# Patient Record
Sex: MALE | Race: BLACK OR AFRICAN AMERICAN | Employment: FULL TIME | URBAN - METROPOLITAN AREA
[De-identification: names, ages, dates, MRNs, and addresses within clinical notes are randomized per-mention and may not be internally consistent; named-entity substitution may affect disease eponyms.]

---

## 2019-02-12 PROBLEM — I65.23 BILATERAL CAROTID ARTERY STENOSIS: Status: ACTIVE | Noted: 2019-02-12

## 2019-02-12 PROBLEM — Z86.73 HISTORY OF CVA (CEREBROVASCULAR ACCIDENT): Status: ACTIVE | Noted: 2019-02-12

## 2019-02-12 PROBLEM — Q21.12 PFO (PATENT FORAMEN OVALE): Status: ACTIVE | Noted: 2019-02-12

## 2019-03-27 ENCOUNTER — HOSPITAL ENCOUNTER (OUTPATIENT)
Dept: CARDIAC CATH/INVASIVE PROCEDURES | Age: 44
Discharge: HOME | End: 2019-03-27
Attending: INTERNAL MEDICINE | Admitting: INTERNAL MEDICINE
Payer: OTHER GOVERNMENT

## 2019-03-27 VITALS
BODY MASS INDEX: 29.13 KG/M2 | WEIGHT: 227 LBS | HEART RATE: 53 BPM | TEMPERATURE: 98 F | RESPIRATION RATE: 14 BRPM | DIASTOLIC BLOOD PRESSURE: 66 MMHG | HEIGHT: 74 IN | SYSTOLIC BLOOD PRESSURE: 119 MMHG | OXYGEN SATURATION: 96 %

## 2019-03-27 LAB
ANION GAP SERPL CALC-SCNC: 6 MMOL/L (ref 7–16)
ATRIAL RATE: 59 BPM
BUN SERPL-MCNC: 16 MG/DL (ref 6–23)
CALCIUM SERPL-MCNC: 8.8 MG/DL (ref 8.3–10.4)
CALCULATED P AXIS, ECG09: 12 DEGREES
CALCULATED R AXIS, ECG10: 58 DEGREES
CALCULATED T AXIS, ECG11: 14 DEGREES
CHLORIDE SERPL-SCNC: 107 MMOL/L (ref 98–107)
CO2 SERPL-SCNC: 26 MMOL/L (ref 21–32)
CREAT SERPL-MCNC: 1.37 MG/DL (ref 0.8–1.5)
DIAGNOSIS, 93000: NORMAL
ERYTHROCYTE [DISTWIDTH] IN BLOOD BY AUTOMATED COUNT: 13.3 % (ref 11.9–14.6)
GLUCOSE SERPL-MCNC: 87 MG/DL (ref 65–100)
HCT VFR BLD AUTO: 48.3 % (ref 41.1–50.3)
HGB BLD-MCNC: 16.3 G/DL (ref 13.6–17.2)
INR PPP: 1.1
MAGNESIUM SERPL-MCNC: 2.2 MG/DL (ref 1.8–2.4)
MCH RBC QN AUTO: 31.2 PG (ref 26.1–32.9)
MCHC RBC AUTO-ENTMCNC: 33.7 G/DL (ref 31.4–35)
MCV RBC AUTO: 92.5 FL (ref 79.6–97.8)
NRBC # BLD: 0 K/UL (ref 0–0.2)
P-R INTERVAL, ECG05: 256 MS
PLATELET # BLD AUTO: 225 K/UL (ref 150–450)
PMV BLD AUTO: 10.5 FL (ref 9.4–12.3)
POTASSIUM SERPL-SCNC: 4.3 MMOL/L (ref 3.5–5.1)
PROTHROMBIN TIME: 13.9 SEC (ref 11.7–14.5)
Q-T INTERVAL, ECG07: 450 MS
QRS DURATION, ECG06: 84 MS
QTC CALCULATION (BEZET), ECG08: 445 MS
RBC # BLD AUTO: 5.22 M/UL (ref 4.23–5.6)
SODIUM SERPL-SCNC: 139 MMOL/L (ref 136–145)
VENTRICULAR RATE, ECG03: 59 BPM
WBC # BLD AUTO: 5.4 K/UL (ref 4.3–11.1)

## 2019-03-27 PROCEDURE — 85610 PROTHROMBIN TIME: CPT

## 2019-03-27 PROCEDURE — 74011250636 HC RX REV CODE- 250/636: Performed by: INTERNAL MEDICINE

## 2019-03-27 PROCEDURE — 85027 COMPLETE CBC AUTOMATED: CPT

## 2019-03-27 PROCEDURE — 93312 ECHO TRANSESOPHAGEAL: CPT

## 2019-03-27 PROCEDURE — 80048 BASIC METABOLIC PNL TOTAL CA: CPT

## 2019-03-27 PROCEDURE — 74011250636 HC RX REV CODE- 250/636

## 2019-03-27 PROCEDURE — 93005 ELECTROCARDIOGRAM TRACING: CPT | Performed by: INTERNAL MEDICINE

## 2019-03-27 PROCEDURE — 99152 MOD SED SAME PHYS/QHP 5/>YRS: CPT

## 2019-03-27 PROCEDURE — 83735 ASSAY OF MAGNESIUM: CPT

## 2019-03-27 RX ORDER — FENTANYL CITRATE 50 UG/ML
25-100 INJECTION, SOLUTION INTRAMUSCULAR; INTRAVENOUS
Status: DISCONTINUED | OUTPATIENT
Start: 2019-03-27 | End: 2019-03-27 | Stop reason: HOSPADM

## 2019-03-27 RX ORDER — MIDAZOLAM HYDROCHLORIDE 1 MG/ML
.5-2 INJECTION, SOLUTION INTRAMUSCULAR; INTRAVENOUS
Status: DISCONTINUED | OUTPATIENT
Start: 2019-03-27 | End: 2019-03-27 | Stop reason: HOSPADM

## 2019-03-27 RX ORDER — SODIUM CHLORIDE 9 MG/ML
75 INJECTION, SOLUTION INTRAVENOUS CONTINUOUS
Status: DISCONTINUED | OUTPATIENT
Start: 2019-03-27 | End: 2019-03-27 | Stop reason: HOSPADM

## 2019-03-27 RX ORDER — ASPIRIN 325 MG
325 TABLET, DELAYED RELEASE (ENTERIC COATED) ORAL DAILY
COMMUNITY
End: 2019-04-27

## 2019-03-27 RX ADMIN — FENTANYL CITRATE 50 MCG: 50 INJECTION, SOLUTION INTRAMUSCULAR; INTRAVENOUS at 10:05

## 2019-03-27 RX ADMIN — MIDAZOLAM HYDROCHLORIDE 2 MG: 1 INJECTION, SOLUTION INTRAMUSCULAR; INTRAVENOUS at 10:05

## 2019-03-27 RX ADMIN — MIDAZOLAM HYDROCHLORIDE 2 MG: 1 INJECTION, SOLUTION INTRAMUSCULAR; INTRAVENOUS at 10:11

## 2019-03-27 RX ADMIN — MIDAZOLAM HYDROCHLORIDE 2 MG: 1 INJECTION, SOLUTION INTRAMUSCULAR; INTRAVENOUS at 10:06

## 2019-03-27 RX ADMIN — MIDAZOLAM HYDROCHLORIDE 2 MG: 1 INJECTION, SOLUTION INTRAMUSCULAR; INTRAVENOUS at 10:08

## 2019-03-27 RX ADMIN — SODIUM CHLORIDE 75 ML/HR: 900 INJECTION, SOLUTION INTRAVENOUS at 09:20

## 2019-03-27 RX ADMIN — MIDAZOLAM HYDROCHLORIDE 2 MG: 1 INJECTION, SOLUTION INTRAMUSCULAR; INTRAVENOUS at 10:07

## 2019-03-27 NOTE — PROGRESS NOTES
Patient pre-assessment complete for VIDHI with DR Siddiqi scheduled for 3/27/19 at 9:30am, arrival time 8:30am. Patient verified using . Patient instructed to bring all home medications in labeled bottles on the day of procedure. NPO status reinforced. Instructed they can take all other medications excluding vitamins & supplements. Patient verbalizes understanding of all instructions & denies any questions at this time.

## 2019-03-27 NOTE — PROGRESS NOTES
Patient received to [address] [place] [address] room # 8  Ambulatory from [facility]. Patient scheduled for VIDHI today with Dr Tiffanie Verduzco. Procedure reviewed & questions answered, voiced good understanding consent obtained & placed on chart. All medications and medical history reviewed. Will prep patient per orders. Patient & family updated on plan of care. The patient has a fraility score of 2-WELL, based on independent of ADLs/ambulation. Active lifestyle.

## 2019-03-27 NOTE — PROGRESS NOTES
TRANSFER - OUT REPORT: 
 
VIDHI Dr Jayna Maldonado Numbed approx 0945 Versed 10 mg Fentanyl 50 mcg Pt tolerated well Pt is alert to voice Verbal report given to Danna(name) on Gato Paul  being transferred to CPRU(unit) for routine progression of care Report consisted of patients Situation, Background, Assessment and  
Recommendations(SBAR). Information from the following report(s) SBAR and Procedure Summary was reviewed with the receiving nurse. Lines:  
Peripheral IV 03/27/19 Anterior;Proximal;Right Forearm (Active) Opportunity for questions and clarification was provided.

## 2019-03-27 NOTE — PROGRESS NOTES
Patient up to bedside, vital signs stable. Patient ambulated to bathroom without difficulty. Patient voided without difficulty. 1143 Discharge instructions and home medications reviewed with patient. Time allowed for questions and answers. 1147  Peripheral IV site dc'd without difficulty with tip intact. 1152 Patient discharged to home with family.

## 2019-03-27 NOTE — DISCHARGE INSTRUCTIONS
AFTER YOU TRANSESOPHAGEAL ECHOCARDIOGRAM    Be sure someone else drives you home. You may feel drowsy for several hours. Do not eat or drink for at least two hours after your procedure. You may eat at noon today. Your throat will be numb and there is a risk you might have difficulty swallowing for a while. Be careful when you do eat or drink for the first time especially with hot fluids since you could easily burn your throat. Call your doctor if:    · You are bleeding from your throat or mouth. · You have trouble breathing all of a sudden. · You have chest pain or any pain that spreads to your neck, jaw, or arms. · You have questions or concerns. · You have a fever greater than 101°F.    Doctor: Devang with [facility] Cardiology [phone]    Special Instructions:    DR Kamala Wilkerson WITH [facility] CARDIOLOGY WILL CALL YOU WITH A FOLLOW-UP    No driving for 24 hours.

## 2019-03-27 NOTE — PROCEDURES
Brief Cardiac Procedure Note    Patient: Kari Uribe MRN: [id number]  SSN: [id number]    YOB: 1975  Age: 40 y.o. Sex: male      Date of Procedure: 3/27/2019     Pre-procedure Diagnosis: CVA    Post-procedure Diagnosis: PFO with Atrial septal aneurysm    Procedure: Transesophageal Echocardiogram    Brief Description of Procedure: As above    Performed By: Tim Fitzpatrick MD     Assistants: None    Anesthesia: Moderate Sedation    Estimated Blood Loss: Less than 10 mL      Specimens: None    Implants: None    Findings: PFO with atrial septal aneurysm. Complications: None    Recommendations: PFO closure.      Signed By: Tim Fitzpatrick MD     March 27, 2019

## 2019-03-27 NOTE — PROGRESS NOTES
Report received from Jayleen Cantu Cath Lab RN. Procedural findings communicated. Intra procedural  medication administration reviewed. Progression of care discussed. Patient received into [address] [facility] 8 post VIDHI Routine post procedural vital signs and  assessment initiated yes

## 2019-04-25 NOTE — PROGRESS NOTES
Patient pre-assessment complete for PFO closure with Dr Carrera Delay scheduled for 19 at 8am , arrival time 6am . Patient verified using . Patient instructed to bring all home medications in labeled bottles on the day of procedure. NPO status reinforced. Patient informed to take a full dose aspirin 325mg  or 81 mg x 4 on the day of procedure. Instructed they can take all other medications excluding vitamins & supplements. Patient verbalizes understanding of all instructions & denies any questions at this time.

## 2019-04-26 ENCOUNTER — HOSPITAL ENCOUNTER (OUTPATIENT)
Dept: CARDIAC CATH/INVASIVE PROCEDURES | Age: 44
Discharge: HOME | End: 2019-04-27
Attending: INTERNAL MEDICINE | Admitting: INTERNAL MEDICINE
Payer: OTHER GOVERNMENT

## 2019-04-26 PROBLEM — I25.3 PFO WITH ATRIAL SEPTAL ANEURYSM: Status: ACTIVE | Noted: 2019-04-26

## 2019-04-26 PROBLEM — Q21.12 PFO WITH ATRIAL SEPTAL ANEURYSM: Status: ACTIVE | Noted: 2019-04-26

## 2019-04-26 LAB
ACT BLD: 257 SECS (ref 70–128)
ACT BLD: 263 SECS (ref 70–128)
ALBUMIN SERPL-MCNC: 3.9 G/DL (ref 3.5–5)
ALBUMIN/GLOB SERPL: 1.3 {RATIO} (ref 1.2–3.5)
ALP SERPL-CCNC: 49 U/L (ref 50–136)
ALT SERPL-CCNC: 29 U/L (ref 12–65)
ANION GAP SERPL CALC-SCNC: 7 MMOL/L (ref 7–16)
AST SERPL-CCNC: 34 U/L (ref 15–37)
ATRIAL RATE: 56 BPM
BILIRUB SERPL-MCNC: 1 MG/DL (ref 0.2–1.1)
BUN SERPL-MCNC: 12 MG/DL (ref 6–23)
CALCIUM SERPL-MCNC: 8.6 MG/DL (ref 8.3–10.4)
CALCULATED P AXIS, ECG09: 22 DEGREES
CALCULATED R AXIS, ECG10: 51 DEGREES
CALCULATED T AXIS, ECG11: 36 DEGREES
CHLORIDE SERPL-SCNC: 108 MMOL/L (ref 98–107)
CO2 SERPL-SCNC: 27 MMOL/L (ref 21–32)
CREAT SERPL-MCNC: 1.36 MG/DL (ref 0.8–1.5)
DIAGNOSIS, 93000: NORMAL
ERYTHROCYTE [DISTWIDTH] IN BLOOD BY AUTOMATED COUNT: 13.6 % (ref 11.9–14.6)
GLOBULIN SER CALC-MCNC: 3.1 G/DL (ref 2.3–3.5)
GLUCOSE SERPL-MCNC: 90 MG/DL (ref 65–100)
HCT VFR BLD AUTO: 47 % (ref 41.1–50.3)
HGB BLD-MCNC: 16.2 G/DL (ref 13.6–17.2)
INR PPP: 1.1
MCH RBC QN AUTO: 32 PG (ref 26.1–32.9)
MCHC RBC AUTO-ENTMCNC: 34.5 G/DL (ref 31.4–35)
MCV RBC AUTO: 92.7 FL (ref 79.6–97.8)
NRBC # BLD: 0 K/UL (ref 0–0.2)
P-R INTERVAL, ECG05: 248 MS
PLATELET # BLD AUTO: 209 K/UL (ref 150–450)
PMV BLD AUTO: 10.1 FL (ref 9.4–12.3)
POTASSIUM SERPL-SCNC: 3.4 MMOL/L (ref 3.5–5.1)
PROT SERPL-MCNC: 7 G/DL (ref 6.3–8.2)
PROTHROMBIN TIME: 14.4 SEC (ref 11.7–14.5)
Q-T INTERVAL, ECG07: 472 MS
QRS DURATION, ECG06: 86 MS
QTC CALCULATION (BEZET), ECG08: 455 MS
RBC # BLD AUTO: 5.07 M/UL (ref 4.23–5.6)
SODIUM SERPL-SCNC: 142 MMOL/L (ref 136–145)
VENTRICULAR RATE, ECG03: 56 BPM
WBC # BLD AUTO: 5.5 K/UL (ref 4.3–11.1)

## 2019-04-26 PROCEDURE — 93005 ELECTROCARDIOGRAM TRACING: CPT | Performed by: INTERNAL MEDICINE

## 2019-04-26 PROCEDURE — C1894 INTRO/SHEATH, NON-LASER: HCPCS

## 2019-04-26 PROCEDURE — 77030002912 HC SUT ETHBND J&J -A

## 2019-04-26 PROCEDURE — 93580 TRANSCATH CLOSURE OF ASD: CPT

## 2019-04-26 PROCEDURE — 74011250636 HC RX REV CODE- 250/636

## 2019-04-26 PROCEDURE — C1759 CATH, INTRA ECHOCARDIOGRAPHY: HCPCS

## 2019-04-26 PROCEDURE — 77030013687 HC GD NDL BARD -B

## 2019-04-26 PROCEDURE — 85610 PROTHROMBIN TIME: CPT

## 2019-04-26 PROCEDURE — 74011000258 HC RX REV CODE- 258: Performed by: INTERNAL MEDICINE

## 2019-04-26 PROCEDURE — 76937 US GUIDE VASCULAR ACCESS: CPT

## 2019-04-26 PROCEDURE — 93662 INTRACARDIAC ECG (ICE): CPT

## 2019-04-26 PROCEDURE — C1817 SEPTAL DEFECT IMP SYS: HCPCS

## 2019-04-26 PROCEDURE — 85027 COMPLETE CBC AUTOMATED: CPT

## 2019-04-26 PROCEDURE — 85347 COAGULATION TIME ACTIVATED: CPT

## 2019-04-26 PROCEDURE — 99153 MOD SED SAME PHYS/QHP EA: CPT

## 2019-04-26 PROCEDURE — C1769 GUIDE WIRE: HCPCS

## 2019-04-26 PROCEDURE — 99152 MOD SED SAME PHYS/QHP 5/>YRS: CPT

## 2019-04-26 PROCEDURE — 77030004559 HC CATH ANGI DX SUPT CARD -B

## 2019-04-26 PROCEDURE — 74011250636 HC RX REV CODE- 250/636: Performed by: INTERNAL MEDICINE

## 2019-04-26 PROCEDURE — 74011250637 HC RX REV CODE- 250/637: Performed by: INTERNAL MEDICINE

## 2019-04-26 PROCEDURE — 80053 COMPREHEN METABOLIC PANEL: CPT

## 2019-04-26 RX ORDER — CLOPIDOGREL BISULFATE 75 MG/1
600 TABLET ORAL ONCE
Status: COMPLETED | OUTPATIENT
Start: 2019-04-26 | End: 2019-04-26

## 2019-04-26 RX ORDER — CLOPIDOGREL BISULFATE 75 MG/1
75 TABLET ORAL DAILY
Status: DISCONTINUED | OUTPATIENT
Start: 2019-04-27 | End: 2019-04-27 | Stop reason: HOSPADM

## 2019-04-26 RX ORDER — SODIUM CHLORIDE 0.9 % (FLUSH) 0.9 %
5-40 SYRINGE (ML) INJECTION EVERY 8 HOURS
Status: DISCONTINUED | OUTPATIENT
Start: 2019-04-26 | End: 2019-04-27 | Stop reason: HOSPADM

## 2019-04-26 RX ORDER — MIDAZOLAM HYDROCHLORIDE 1 MG/ML
.5-2 INJECTION, SOLUTION INTRAMUSCULAR; INTRAVENOUS
Status: DISCONTINUED | OUTPATIENT
Start: 2019-04-26 | End: 2019-04-26 | Stop reason: HOSPADM

## 2019-04-26 RX ORDER — HEPARIN SODIUM 200 [USP'U]/100ML
3 INJECTION, SOLUTION INTRAVENOUS CONTINUOUS
Status: DISCONTINUED | OUTPATIENT
Start: 2019-04-26 | End: 2019-04-26 | Stop reason: HOSPADM

## 2019-04-26 RX ORDER — DIAZEPAM 5 MG/1
5 TABLET ORAL ONCE
Status: COMPLETED | OUTPATIENT
Start: 2019-04-26 | End: 2019-04-26

## 2019-04-26 RX ORDER — MAG HYDROX/ALUMINUM HYD/SIMETH 200-200-20
30 SUSPENSION, ORAL (FINAL DOSE FORM) ORAL
Status: COMPLETED | OUTPATIENT
Start: 2019-04-26 | End: 2019-04-26

## 2019-04-26 RX ORDER — FENTANYL CITRATE 50 UG/ML
25-75 INJECTION, SOLUTION INTRAMUSCULAR; INTRAVENOUS
Status: DISCONTINUED | OUTPATIENT
Start: 2019-04-26 | End: 2019-04-26 | Stop reason: HOSPADM

## 2019-04-26 RX ORDER — MORPHINE SULFATE 2 MG/ML
2 INJECTION, SOLUTION INTRAMUSCULAR; INTRAVENOUS
Status: DISCONTINUED | OUTPATIENT
Start: 2019-04-26 | End: 2019-04-27 | Stop reason: HOSPADM

## 2019-04-26 RX ORDER — HYDROCODONE BITARTRATE AND ACETAMINOPHEN 5; 325 MG/1; MG/1
1 TABLET ORAL
Status: DISCONTINUED | OUTPATIENT
Start: 2019-04-26 | End: 2019-04-27 | Stop reason: HOSPADM

## 2019-04-26 RX ORDER — GUAIFENESIN 100 MG/5ML
81-324 LIQUID (ML) ORAL ONCE
Status: DISCONTINUED | OUTPATIENT
Start: 2019-04-26 | End: 2019-04-26 | Stop reason: HOSPADM

## 2019-04-26 RX ORDER — GUAIFENESIN 100 MG/5ML
81 LIQUID (ML) ORAL DAILY
Status: DISCONTINUED | OUTPATIENT
Start: 2019-04-27 | End: 2019-04-27 | Stop reason: HOSPADM

## 2019-04-26 RX ORDER — HEPARIN SODIUM 10000 [USP'U]/ML
1000-10000 INJECTION, SOLUTION INTRAVENOUS; SUBCUTANEOUS
Status: DISCONTINUED | OUTPATIENT
Start: 2019-04-26 | End: 2019-04-26 | Stop reason: HOSPADM

## 2019-04-26 RX ORDER — SODIUM CHLORIDE 9 MG/ML
75 INJECTION, SOLUTION INTRAVENOUS CONTINUOUS
Status: DISCONTINUED | OUTPATIENT
Start: 2019-04-26 | End: 2019-04-27 | Stop reason: HOSPADM

## 2019-04-26 RX ORDER — SODIUM CHLORIDE 0.9 % (FLUSH) 0.9 %
5-40 SYRINGE (ML) INJECTION AS NEEDED
Status: DISCONTINUED | OUTPATIENT
Start: 2019-04-26 | End: 2019-04-27 | Stop reason: HOSPADM

## 2019-04-26 RX ORDER — CEFAZOLIN SODIUM/WATER 2 G/20 ML
2 SYRINGE (ML) INTRAVENOUS
Status: DISCONTINUED | OUTPATIENT
Start: 2019-04-26 | End: 2019-04-26

## 2019-04-26 RX ORDER — LIDOCAINE HYDROCHLORIDE 20 MG/ML
2-10 INJECTION, SOLUTION INFILTRATION; PERINEURAL
Status: DISCONTINUED | OUTPATIENT
Start: 2019-04-26 | End: 2019-04-26 | Stop reason: HOSPADM

## 2019-04-26 RX ORDER — ACETAMINOPHEN 325 MG/1
650 TABLET ORAL
Status: DISCONTINUED | OUTPATIENT
Start: 2019-04-26 | End: 2019-04-27 | Stop reason: HOSPADM

## 2019-04-26 RX ADMIN — HEPARIN SODIUM 3000 UNITS: 10000 INJECTION INTRAVENOUS; SUBCUTANEOUS at 09:12

## 2019-04-26 RX ADMIN — ALUMINUM HYDROXIDE, MAGNESIUM HYDROXIDE, AND SIMETHICONE 30 ML: 200; 200; 20 SUSPENSION ORAL at 09:34

## 2019-04-26 RX ADMIN — MIDAZOLAM HYDROCHLORIDE 2 MG: 1 INJECTION, SOLUTION INTRAMUSCULAR; INTRAVENOUS at 08:47

## 2019-04-26 RX ADMIN — HEPARIN SODIUM 10000 UNITS: 10000 INJECTION INTRAVENOUS; SUBCUTANEOUS at 09:01

## 2019-04-26 RX ADMIN — Medication 10 ML: at 21:49

## 2019-04-26 RX ADMIN — MIDAZOLAM HYDROCHLORIDE 1 MG: 1 INJECTION, SOLUTION INTRAMUSCULAR; INTRAVENOUS at 09:30

## 2019-04-26 RX ADMIN — MIDAZOLAM HYDROCHLORIDE 1 MG: 1 INJECTION, SOLUTION INTRAMUSCULAR; INTRAVENOUS at 08:59

## 2019-04-26 RX ADMIN — DIAZEPAM 5 MG: 5 TABLET ORAL at 07:06

## 2019-04-26 RX ADMIN — CEFAZOLIN SODIUM 1 G: 1 INJECTION, POWDER, FOR SOLUTION INTRAMUSCULAR; INTRAVENOUS at 09:42

## 2019-04-26 RX ADMIN — MIDAZOLAM HYDROCHLORIDE 2 MG: 1 INJECTION, SOLUTION INTRAMUSCULAR; INTRAVENOUS at 08:54

## 2019-04-26 RX ADMIN — Medication 10 ML: at 17:47

## 2019-04-26 RX ADMIN — FENTANYL CITRATE 25 MCG: 50 INJECTION, SOLUTION INTRAMUSCULAR; INTRAVENOUS at 09:30

## 2019-04-26 RX ADMIN — FENTANYL CITRATE 25 MCG: 50 INJECTION, SOLUTION INTRAMUSCULAR; INTRAVENOUS at 08:48

## 2019-04-26 RX ADMIN — HEPARIN SODIUM 2000 UNITS: 10000 INJECTION INTRAVENOUS; SUBCUTANEOUS at 09:26

## 2019-04-26 RX ADMIN — HEPARIN SODIUM 3 UNITS/HR: 200 INJECTION, SOLUTION INTRAVENOUS at 08:29

## 2019-04-26 RX ADMIN — LIDOCAINE HYDROCHLORIDE 200 MG: 20 INJECTION, SOLUTION INFILTRATION; PERINEURAL at 08:56

## 2019-04-26 RX ADMIN — CLOPIDOGREL BISULFATE 600 MG: 75 TABLET ORAL at 09:34

## 2019-04-26 NOTE — PROGRESS NOTES
Pt arrived, ambulated to room with no visible problems, planned PFO Closure for Dr Ana Cristina Albrecht. Consent signed, Procedure discussed with pt all questions answered voiced understanding. Medications and history discussed with pt. Pt prepped per ordersThe patient has a fraility score of 2-WELL, based on ability to complete ADLs without assistance increased symptoms on exertion.

## 2019-04-26 NOTE — PROGRESS NOTES
TRANSFER - IN REPORT: 
 
Verbal report received from [place]) on Arleta Pleasure  being received from cath lab(unit) for routine progression of care Report consisted of patients Situation, Background, Assessment and  
Recommendations(SBAR). Information from the following report(s) Procedure Summary was reviewed with the receiving nurse. Opportunity for questions and clarification was provided. Assessment completed upon patients arrival to unit and care assumed.

## 2019-04-26 NOTE — ROUTINE PROCESS
TRANSFER - OUT REPORT: 
 
PFO closure Dr. Wyatt Woody RFV/LFV access Versed 6mg, fentanyl 50mcg, heparin 50731 units IV, plavix 600mg, mylanta 30ml Successful PFO closure Figure 8 suture closure both groins Verbal report given to Nani Guidry RN(name) on Rehan Umaña  being transferred to cpru(unit) for routine progression of care Report consisted of patients Situation, Background, Assessment and  
Recommendations(SBAR). Information from the following report(s) Procedure Summary was reviewed with the receiving nurse. Lines:  
Peripheral IV 04/26/19 Anterior;Right Forearm (Active) Peripheral IV 04/26/19 Left Antecubital (Active) Opportunity for questions and clarification was provided. Patient transported with: 
 [employer]

## 2019-04-26 NOTE — PROGRESS NOTES
Assisted patient to bathroom without difficulty. Patient returned to bed and groins assessed. Remain stable. Patient instructed he must call for assistance with all ambulation. Reviewed signs/symptoms of bleeding to report to nurse. Patient voices understanding.

## 2019-04-26 NOTE — PROGRESS NOTES
TRANSFER - OUT REPORT: 
 
Verbal report given to Raad Jesus RN(name) on Vernon Seip  being transferred to  stepdown(unit) for routine progression of care Report consisted of patients Situation, Background, Assessment and  
Recommendations(SBAR). Information from the following report(s) Procedure Summary was reviewed with the receiving nurse. Lines:  
Peripheral IV 04/26/19 Anterior;Right Forearm (Active) Peripheral IV 04/26/19 Left Antecubital (Active) Opportunity for questions and clarification was provided. Patient transported with: 
 [employer]

## 2019-04-26 NOTE — PROGRESS NOTES
Initial visit to assess pt's spiritual needs. Feeling today? Much better Receiving good care? Very good Needs from Spiritual Care:  Nothing at this time Ministry of presence & prayer to demonstrate caring & concern, convey emotional & spiritual support.  
 
amie Varghese MDiv,[facility],PhD

## 2019-04-26 NOTE — OP NOTES
PROCEDURE/OPERATIVE REPORT    Patient: Rehan Umaña MRN: [id number]  SSN: [id number]    YOB: 1975  Age: 40 y.o. Sex: male      DATE OF PROCEDURE: 4/26/2019     PROCEDURE: PERCUTANEOUS CLOSURE OF PATENT FORAMEN OVALE (PFO)    INDICATION:   The patient is a 40y.o. year old patient with documented cryptogenic stroke felt likely due to paradoxical emboli from right to left intracardiac shunt across PFO. Thorough evaluation pre-procedure without alternative etiology established. Closure felt indicated based on available data reviewed by both Cardiology and Neurology. This was discussed with patient and after a thorough discussion of all the risks and proposed benefits, the patient agreed to proceed. TYPE OF ANESTHESIA:  MODERATE SEDATION. Total of 6 mg of Versed and 50 mcg of Fentanyl was administered by Victoria Sosa RN under my supervision. Sedation start time was 0847 with procedure completion time of 0932. PROCEDURAL DETAILS: After informed consent, patient was brought to the [facility] catheterization lab. Time-out was performed. Patient was prepped and draped in standard, meticulous surgical fashion with exposure of right and left femoral areas. Utilizing a Site-rite Ultrasound, the right and left common femoral veins were identified. A static image was placed on the chart. Venous puncture was performed under direct ultrasound guidance, a 6-Guyanese sheath was placed in the right common femoral vein and a 11 Jamaican sheath was placed in left common femoral vein. At this point, patient was given IV heparin with monitoring of ACT to maintain ACT > 300. An AcuNav intracardiac echocardiographic catheter was advanced via the left femoral vein into the right atrium. A complete evaluation of the entire atrial septum was performed. This showed a patent foramen ovale without any other septal defects identified.    At this point, a 6-Guyanese multipurpose catheter was advanced via the right common femoral vein into the right atrium. Utilizing both fluoroscopy and intracardiac echo the catheter was advanced across the defect into the left atrium. Utilizing a  soft tipped J wire the multipurpose catheter was ultimately placed in left upper pulmonary vein. The multipurpose was then exchanged for an Amplatzer Super Stiff wire. With the stiff wire in place across the defect, the defect was interrogated in multiple views. At this point, based on the size of the defect, it was felt an 25 mm Amplatzer PFO occluder would provide adequate closure. A 9-Marshallese Amplatzer torque delivery system was then prepped and advanced into the left atrium. The Amplatzer PFO occluder was then prepped and advanced into the left atrium. The left atrial disk was deployed. Under fluoroscopic and echocardiographic visualization, the device was gently retracted until the left disk was in contact with left side of interatrial septum. At this point, the right atrial disk was then deployed. A complete evaluation with intracardiac echocardiogram was performed, which showed excellent capture of the septum primum and septum secundum between the two discs. A push-pull technique was utilized, which showed good capture of all rims of tissue. There was no residual flow around the device. After thorough interrogation,  the device was released. Final echocardiographic images were obtained after device was released, which showed excellent device position. At the conclusion of the procedure, the ICE catheter was removed. A figure-of-eight suture was placed on both sides followed by sheath removal.  Good hemostasis was achieved. Patient was taken to recover in stable condition. CONCLUSION:  Successful PFO closure with 25 mm Amplatzer PFO Occluder.         Garrick Cunningham MD

## 2019-04-26 NOTE — PROGRESS NOTES
TRANSFER - IN REPORT: 
 
Verbal report received from [address] [place], [place] [address]kelseyhelen Snow RN(name) on Nasreen Lipoma  being received from Cath recovery (unit) for routine progression of care Report consisted of patients Situation, Background, Assessment and  
Recommendations(SBAR). Information from the following report(s) SBAR, Kardex, Procedure Summary, Intake/Output, MAR and Cardiac Rhythm Sinus Glorya Stair was reviewed with the receiving nurse. Opportunity for questions and clarification was provided. Assessment completed upon patients arrival to unit and care assumed. Dual nurse skin assessment performed. No areas of breakdown noted. Bilateral groin punctures covered with clean, dry dressing.

## 2019-04-26 NOTE — H&P
[facility] CARDIOLOGY History & Physical 
            
 
 
Subjective:  
 
Patient is a 70-year-old male who was referred by Dr. Madelaine Carvalho for evaluation of a PFO in the setting of a cryptogenic stroke which occurred in 2017. He underwent transesophageal echocardiogram which showed an atrial septal aneurysm and a PFO. Of note: 24 hours prior to his CVA he had formed from [place] to Cut off. No vascular disease was noted. Patient presents today for PFO closure. Past Medical History:  
Diagnosis Date  History of torn meniscus of right knee 2016  
 in [address] [facility] [address] Hyperlipidemia  Stroke [facility]) 2017  
 atrial septal aneurysm, PFO Past Surgical History:  
Procedure Laterality Date  HX ORTHOPAEDIC Left 2012  
 shoulder surgery  HX ORTHOPAEDIC Left 2008  
 foot surgery, screw placed in big toe  HX ORTHOPAEDIC Left 2011  
 knee surgery  HX ORTHOPAEDIC Right 2014  
 hip surgery Allergies Allergen Reactions  Pcn [Penicillins] Rash Social History Tobacco Use  Smoking status: Never Smoker  Smokeless tobacco: Never Used Substance Use Topics  Alcohol use: Not Currently FH:  
Family History Problem Relation Age of Onset  Diabetes Mother  Hypertension Father Review of Systems Constitutional: Negative for chills and fever. HENT: Negative for tinnitus. Eyes: Negative for blurred vision. Respiratory: Negative for cough and shortness of breath. Cardiovascular: Negative for orthopnea. Gastrointestinal: Negative for abdominal pain and nausea. Genitourinary: Negative for dysuria. Musculoskeletal: Negative for falls. Skin: Negative for rash. Neurological: Negative for tremors, sensory change and headaches. Endo/Heme/Allergies: Does not bruise/bleed easily. Psychiatric/Behavioral: Negative for depression and suicidal ideas. Objective:  
 
 
Visit Vitals /75 (BP 1 Location: Right arm, BP Patient Position: At rest) Pulse (!) 58 Temp 98.1 °F (36.7 °C) Ht 6' 2\" (1.88 m) Wt 102.1 kg (225 lb) SpO2 96% BMI 28.89 kg/m² No intake/output data recorded. No intake/output data recorded. Physical Exam  
Constitutional: He is oriented to person, place, and time and well-developed, well-nourished, and in no distress. HENT:  
Head: Atraumatic. Eyes: Pupils are equal, round, and reactive to light. Conjunctivae are normal.  
Neck: No JVD present. No thyromegaly present. Cardiovascular: Normal rate and regular rhythm. No murmur heard. Pulmonary/Chest: Effort normal and breath sounds normal.  
Abdominal: Soft. Bowel sounds are normal. He exhibits no distension. There is no tenderness. Musculoskeletal: He exhibits no edema. Neurological: He is alert and oriented to person, place, and time. Skin: Skin is warm. No rash noted. Psychiatric: Mood normal.  
 
 
 
 
Data Review:  
Labs:  
Recent Labs  
  04/26/19 2047   
K 3.4*  
BUN 12  
CREA 1.36  
GLU 90 WBC 5.5 HGB 16.2 HCT 47.0  INR 1.1 No results found for: ANABEL Polanco Assessment/Plan: Active Problems: 
  History of CVA (cerebrovascular accident) (2/12/2019) Prior cryptogenic CVA with high-risk PFO. He has a atrial septal aneurysm and PFO. It is likely that his CVA came from paradoxical embolization. Plans for PFO closure as below. PFO (patent foramen ovale) (2/12/2019) I discussed the procedure, risks, benefits and alternatives with the patient in detail. We also discussed the data from the Long-Term RESPECT trial which showed a 52% relative risk reduction and recurrent events with PFO closure versus medical therapy.   We also discussed the recently released CLOSE trial (5 year absolute risk reduction of stroke of 4.9% wtih PFO closure) and [place]-Reduce trials (77% relative risk reduction in recurrent CVA with closure). Patient voiced willingness to proceed. Informed consent obtained. All questions answered.   
 
 
 
 
 
 
 
 
CA Monroy 
4/26/2019 
8:39 AM

## 2019-04-27 VITALS
DIASTOLIC BLOOD PRESSURE: 81 MMHG | HEART RATE: 60 BPM | RESPIRATION RATE: 20 BRPM | SYSTOLIC BLOOD PRESSURE: 129 MMHG | HEIGHT: 74 IN | TEMPERATURE: 97 F | OXYGEN SATURATION: 99 % | WEIGHT: 225 LBS | BODY MASS INDEX: 28.88 KG/M2

## 2019-04-27 LAB
ANION GAP SERPL CALC-SCNC: 6 MMOL/L (ref 7–16)
ATRIAL RATE: 54 BPM
BASOPHILS # BLD: 0 K/UL (ref 0–0.2)
BASOPHILS NFR BLD: 1 % (ref 0–2)
BUN SERPL-MCNC: 13 MG/DL (ref 6–23)
CALCIUM SERPL-MCNC: 8.6 MG/DL (ref 8.3–10.4)
CALCULATED P AXIS, ECG09: 33 DEGREES
CALCULATED R AXIS, ECG10: 54 DEGREES
CALCULATED T AXIS, ECG11: 10 DEGREES
CHLORIDE SERPL-SCNC: 110 MMOL/L (ref 98–107)
CO2 SERPL-SCNC: 28 MMOL/L (ref 21–32)
CREAT SERPL-MCNC: 1.28 MG/DL (ref 0.8–1.5)
DIAGNOSIS, 93000: NORMAL
DIFFERENTIAL METHOD BLD: NORMAL
EOSINOPHIL # BLD: 0.1 K/UL (ref 0–0.8)
EOSINOPHIL NFR BLD: 2 % (ref 0.5–7.8)
ERYTHROCYTE [DISTWIDTH] IN BLOOD BY AUTOMATED COUNT: 13.6 % (ref 11.9–14.6)
GLUCOSE SERPL-MCNC: 91 MG/DL (ref 65–100)
HCT VFR BLD AUTO: 46.9 % (ref 41.1–50.3)
HGB BLD-MCNC: 16 G/DL (ref 13.6–17.2)
IMM GRANULOCYTES # BLD AUTO: 0 K/UL (ref 0–0.5)
IMM GRANULOCYTES NFR BLD AUTO: 0 % (ref 0–5)
LYMPHOCYTES # BLD: 2.2 K/UL (ref 0.5–4.6)
LYMPHOCYTES NFR BLD: 34 % (ref 13–44)
MAGNESIUM SERPL-MCNC: 2.2 MG/DL (ref 1.8–2.4)
MCH RBC QN AUTO: 32.1 PG (ref 26.1–32.9)
MCHC RBC AUTO-ENTMCNC: 34.1 G/DL (ref 31.4–35)
MCV RBC AUTO: 94.2 FL (ref 79.6–97.8)
MONOCYTES # BLD: 0.5 K/UL (ref 0.1–1.3)
MONOCYTES NFR BLD: 7 % (ref 4–12)
NEUTS SEG # BLD: 3.7 K/UL (ref 1.7–8.2)
NEUTS SEG NFR BLD: 57 % (ref 43–78)
NRBC # BLD: 0 K/UL (ref 0–0.2)
P-R INTERVAL, ECG05: 246 MS
PLATELET # BLD AUTO: 203 K/UL (ref 150–450)
PMV BLD AUTO: 10.2 FL (ref 9.4–12.3)
POTASSIUM SERPL-SCNC: 3.7 MMOL/L (ref 3.5–5.1)
Q-T INTERVAL, ECG07: 472 MS
QRS DURATION, ECG06: 82 MS
QTC CALCULATION (BEZET), ECG08: 447 MS
RBC # BLD AUTO: 4.98 M/UL (ref 4.23–5.6)
SODIUM SERPL-SCNC: 144 MMOL/L (ref 136–145)
VENTRICULAR RATE, ECG03: 54 BPM
WBC # BLD AUTO: 6.6 K/UL (ref 4.3–11.1)

## 2019-04-27 PROCEDURE — 36415 COLL VENOUS BLD VENIPUNCTURE: CPT

## 2019-04-27 PROCEDURE — 80048 BASIC METABOLIC PNL TOTAL CA: CPT

## 2019-04-27 PROCEDURE — 93005 ELECTROCARDIOGRAM TRACING: CPT | Performed by: INTERNAL MEDICINE

## 2019-04-27 PROCEDURE — 93308 TTE F-UP OR LMTD: CPT

## 2019-04-27 PROCEDURE — 83735 ASSAY OF MAGNESIUM: CPT

## 2019-04-27 PROCEDURE — 74011250637 HC RX REV CODE- 250/637: Performed by: INTERNAL MEDICINE

## 2019-04-27 PROCEDURE — 85025 COMPLETE CBC W/AUTO DIFF WBC: CPT

## 2019-04-27 RX ORDER — GUAIFENESIN 100 MG/5ML
81 LIQUID (ML) ORAL DAILY
Qty: 30 TAB | Refills: 11 | Status: SHIPPED | OUTPATIENT
Start: 2019-04-28 | End: 2019-10-10 | Stop reason: SDUPTHER

## 2019-04-27 RX ORDER — CLOPIDOGREL BISULFATE 75 MG/1
75 TABLET ORAL DAILY
Qty: 30 TAB | Refills: 11 | Status: SHIPPED | OUTPATIENT
Start: 2019-04-28 | End: 2019-10-10

## 2019-04-27 RX ADMIN — CLOPIDOGREL BISULFATE 75 MG: 75 TABLET ORAL at 08:48

## 2019-04-27 RX ADMIN — Medication 10 ML: at 05:45

## 2019-04-27 RX ADMIN — ASPIRIN 81 MG 81 MG: 81 TABLET ORAL at 08:48

## 2019-04-27 NOTE — PROGRESS NOTES
Discharge instructions, prescriptions, and follow up appt information given to and reviewed with patient. Opportunity for questions provided, pt voiced understanding. Pt stable at time of d/c. All pt belongings taken with pt from room. Pt transported to discharge area via wheelchair, family member driving pt home.

## 2019-04-27 NOTE — DISCHARGE SUMMARY
[address] [place] [address] Cardiology Discharge Summary     Patient ID:  Nasreen Potts  [id number]  40 y.o.  1975    Admit date: 4/26/2019    Discharge date:  4/27/2019    Admitting Physician: Augusto Arias MD     Discharge Physician: Dr. Alonzo Bass    Admission Diagnoses: PFO (patent foramen ovale) [Q21.1]  PFO with atrial septal aneurysm [Q21.1]    Discharge Diagnoses:    Diagnosis    PFO with atrial septal aneurysm    History of CVA (cerebrovascular accident)    PFO (patent foramen ovale)    Bilateral carotid artery stenosis       Cardiology Procedures this admission: PFO closure with 25 mm Amplatzer PFO Occluder  Consults: None      Hospital Course: Patient was seen at the office of [address] [place] [address] Cardiology by Dr. Zaire Sousa as a referral by Dr. Miri Fitzpatrick for evaluation of a PFO in the setting of a cryptogenic stroke which occurred in 2017. He underwent transesophageal echocardiogram which showed an atrial septal aneurysm and a PFO. He was subsequently scheduled for PFO closure at [facility] on 4/26/19. Patient was taken to the [address] [place] [address] by Dr. Zaire Sousa and had successful PFO closure with 25 mm Amplatzer PFO Occluder. Patient tolerated the procedure well and was taken to the telemetry floor for recovery. The following morning patient was up feeling well without any complaints of chest pain or shortness of breath. Patient's bilateral groin cath sites were clean, dry and intact without hematoma or bleeding. Patient's labs were stable. ECHO revealed:well seated PFO occluder device. Patient was seen and examined by Dr. Alonzo Bass and determined stable and ready for discharge. Patient was instructed on the importance of medication compliance. The patient will follow up with [address] [place] [address] Cardiology Dr. Zaire Sousa and will have repeat ECHO in 4 weeks. DISPOSITION: The patient is being discharged home in stable condition on a low saturated fat, low cholesterol and low salt diet.  The patient is instructed to advance activities as tolerated to the limit of fatigue or shortness of breath. The patient is instructed to avoid all heavy lifting, straining, stooping or squatting for 3-5 days. The patient is instructed to watch the cath site for bleeding/oozing; if seen, the patient is instructed to apply firm pressure with a clean cloth and call [facility] Cardiology at [phone]. The patient is instructed to watch for signs of infection which include: increasing area of redness, fever/hot to touch or purulent drainage at the catheterization site. The patient is instructed not to soak in a bathtub for 7-10 days, but is cleared to shower. The patient is instructed to call the office or return to the ER for immediate evaluation for any shortness of breath or chest pain. Discharge Exam:   Visit Vitals  /76   Pulse (!) 57   Temp 97.8 °F (36.6 °C)   Resp 18   Ht 6' 2\" (1.88 m)   Wt 102.1 kg (225 lb)   SpO2 97%   BMI 28.89 kg/m²       Patient has been seen by Dr. Rolf Nuñez Sons: see his progress note for exam details.     Recent Results (from the past 24 hour(s))   CBC W/O DIFF    Collection Time: 04/26/19  6:42 AM   Result Value Ref Range    WBC 5.5 4.3 - 11.1 K/uL    RBC 5.07 4.23 - 5.6 M/uL    HGB 16.2 13.6 - 17.2 g/dL    HCT 47.0 41.1 - 50.3 %    MCV 92.7 79.6 - 97.8 FL    MCH 32.0 26.1 - 32.9 PG    MCHC 34.5 31.4 - 35.0 g/dL    RDW 13.6 11.9 - 14.6 %    PLATELET 522 710 - 825 K/uL    MPV 10.1 9.4 - 12.3 FL    ABSOLUTE NRBC 0.00 0.0 - 0.2 K/uL   METABOLIC PANEL, COMPREHENSIVE    Collection Time: 04/26/19  6:42 AM   Result Value Ref Range    Sodium 142 136 - 145 mmol/L    Potassium 3.4 (L) 3.5 - 5.1 mmol/L    Chloride 108 (H) 98 - 107 mmol/L    CO2 27 21 - 32 mmol/L    Anion gap 7 7 - 16 mmol/L    Glucose 90 65 - 100 mg/dL    BUN 12 6 - 23 MG/DL    Creatinine 1.36 0.8 - 1.5 MG/DL    GFR est AA >60 >60 ml/min/1.73m2    GFR est non-AA >60 >60 ml/min/1.73m2    Calcium 8.6 8.3 - 10.4 MG/DL    Bilirubin, total 1.0 0.2 - 1.1 MG/DL    ALT (SGPT) 29 12 - 65 U/L    AST (SGOT) 34 15 - 37 U/L    Alk. phosphatase 49 (L) 50 - 136 U/L    Protein, total 7.0 6.3 - 8.2 g/dL    Albumin 3.9 3.5 - 5.0 g/dL    Globulin 3.1 2.3 - 3.5 g/dL    A-G Ratio 1.3 1.2 - 3.5     PROTHROMBIN TIME + INR    Collection Time: 04/26/19  6:42 AM   Result Value Ref Range    Prothrombin time 14.4 11.7 - 14.5 sec    INR 1.1     EKG, 12 LEAD, INITIAL    Collection Time: 04/26/19  6:58 AM   Result Value Ref Range    Ventricular Rate 56 BPM    Atrial Rate 56 BPM    P-R Interval 248 ms    QRS Duration 86 ms    Q-T Interval 472 ms    QTC Calculation (Bezet) 455 ms    Calculated P Axis 22 degrees    Calculated R Axis 51 degrees    Calculated T Axis 36 degrees    Diagnosis       Sinus bradycardia with 1st degree A-V block  Otherwise normal ECG  When compared with ECG of 27-MAR-2019 09:00,  No significant change was found  Confirmed by TOBY MAGANA (), Gala Hernandez ([id number]) on 4/26/2019 8:00:42 AM     POC ACTIVATED CLOTTING TIME    Collection Time: 04/26/19  9:07 AM   Result Value Ref Range    Activated Clotting Time (POC) 257 (H) 70 - 128 SECS   POC ACTIVATED CLOTTING TIME    Collection Time: 04/26/19  9:22 AM   Result Value Ref Range    Activated Clotting Time (POC) 263 (H) 70 - 128 SECS         Patient Instructions:   Current Discharge Medication List      START taking these medications    Details   aspirin 81 mg chewable tablet Take 1 Tab by mouth daily. Qty: 30 Tab, Refills: 11      clopidogrel (PLAVIX) 75 mg tab Take 1 Tab by mouth daily.   Qty: 30 Tab, Refills: 11         STOP taking these medications       aspirin delayed-release 325 mg tablet Comments:   Reason for Stopping:

## 2019-04-27 NOTE — DISCHARGE INSTRUCTIONS
Patient Education   Patient Education        Heart-Healthy Diet: Care Instructions  Your Care Instructions    A heart-healthy diet has lots of vegetables, fruits, nuts, beans, and whole grains, and is low in salt. It limits foods that are high in saturated fat, such as meats, cheeses, and fried foods. It may be hard to change your diet, but even small changes can lower your risk of heart attack and heart disease. Follow-up care is a key part of your treatment and safety. Be sure to make and go to all appointments, and call your doctor if you are having problems. It's also a good idea to know your test results and keep a list of the medicines you take. How can you care for yourself at home? Watch your portions  · Learn what a serving is. A \"serving\" and a \"portion\" are not always the same thing. Make sure that you are not eating larger portions than are recommended. For example, a serving of pasta is ½ cup. A serving size of meat is 2 to 3 ounces. A 3-ounce serving is about the size of a deck of cards. Measure serving sizes until you are good at [place]" them. Keep in mind that restaurants often serve portions that are 2 or 3 times the size of one serving. · To keep your energy level up and keep you from feeling hungry, eat often but in smaller portions. · Eat only the number of calories you need to stay at a healthy weight. If you need to lose weight, eat fewer calories than your body burns (through exercise and other physical activity). Eat more fruits and vegetables  · Eat a variety of fruit and vegetables every day. Dark green, deep orange, red, or yellow fruits and vegetables are especially good for you. Examples include spinach, carrots, peaches, and berries. · Keep carrots, celery, and other veggies handy for snacks. Buy fruit that is in season and store it where you can see it so that you will be tempted to eat it.   · Cook dishes that have a lot of veggies in them, such as stir-fries and soups.  Limit saturated and trans fat  · Read food labels, and try to avoid saturated and trans fats. They increase your risk of heart disease. Trans fat is found in many processed foods such as cookies and crackers. · Use olive or canola oil when you cook. Try cholesterol-lowering spreads, such as Benecol or Take Control. · Bake, broil, grill, or steam foods instead of frying them. · Choose lean meats instead of high-fat meats such as hot dogs and sausages. Cut off all visible fat when you prepare meat. · Eat fish, skinless poultry, and meat alternatives such as soy products instead of high-fat meats. Soy products, such as tofu, may be especially good for your heart. · Choose low-fat or fat-free milk and dairy products. Eat fish  · Eat at least two servings of fish a week. Certain fish, such as salmon and tuna, contain omega-3 fatty acids, which may help reduce your risk of heart attack. Eat foods high in fiber  · Eat a variety of grain products every day. Include whole-grain foods that have lots of fiber and nutrients. Examples of whole-grain foods include oats, whole wheat bread, and brown rice. · Buy whole-grain breads and cereals, instead of white bread or pastries. Limit salt and sodium  · Limit how much salt and sodium you eat to help lower your blood pressure. · Taste food before you salt it. Add only a little salt when you think you need it. With time, your taste buds will adjust to less salt. · Eat fewer snack items, fast foods, and other high-salt, processed foods. Check food labels for the amount of sodium in packaged foods. · Choose low-sodium versions of canned goods (such as soups, vegetables, and beans). Limit sugar  · Limit drinks and foods with added sugar. These include candy, desserts, and soda pop. Limit alcohol  · Limit alcohol to no more than 2 drinks a day for men and 1 drink a day for women. Too much alcohol can cause health problems. When should you call for help?   Watch closely for changes in your health, and be sure to contact your doctor if:    · You would like help planning heart-healthy meals. Where can you learn more? Go to http://brady-sharmin.info/. Enter V137 in the search box to learn more about \"Heart-Healthy Diet: Care Instructions. \"  Current as of: July 22, 2018  Content Version: 11.9  © 7279-0744 [employer]. Care instructions adapted under license by [employer] (which disclaims liability or warranty for this information). If you have questions about a medical condition or this instruction, always ask your healthcare professional. [place] [address] any warranty or liability for your use of this information. Secondhand Smoke: Care Instructions  Your Care Instructions    Secondhand smoke comes from the burning end of a cigarette, cigar, or pipe and the smoke that a smoker exhales. The smoke contains nicotine and many other harmful chemicals. Breathing secondhand smoke can cause or worsen health problems including cancer, asthma, coronary artery disease, and respiratory infections. It can make your eyes and nose burn and cause a sore throat. Secondhand smoke is especially bad for babies and young children whose lungs are still developing. Babies whose parents smoke are more likely to have ear infections, pneumonia, and bronchitis in the first few years of their lives. Secondhand smoke can make asthma symptoms worse in children. If you are pregnant, it is important that you not smoke and that you avoid secondhand smoke. You are more likely to give birth to a baby who weighs less than expected (low birth weight) if you smoke. And your baby may have a greater risk for sudden infant death syndrome (SIDS). Babies whose mothers are exposed to secondhand smoke during pregnancy have a higher risk for health problems. Follow-up care is a key part of your treatment and safety.  Be sure to make and go to all appointments, and call your doctor if you are having problems. It's also a good idea to know your test results and keep a list of the medicines you take. How can you care for yourself at home? · Do not smoke or let anyone else smoke in your home. If people must smoke, ask them to go outside. · If people do smoke in your home, choose a room where you can open a window or use a fan to get the smoke outside. · Do not let anyone smoke in your car. If someone must smoke, pull over in a safe place and let him or her smoke away from the car. · Ask your employer to make sure that you have a smoke-free work area. · Make sure that your children are not exposed to secondhand smoke at day care, school, and after-school programs. · Try to choose nonsmoking bars, restaurants, and other public places when you go out. · Help your family and friends who smoke to quit by encouraging them to try. Tell them about treatment resources. Having support from others often helps. · If you smoke, quit. Quitting is hard, but there are ways to boost your chance of quitting tobacco for good. ? Use nicotine gum, patches, or lozenges. Call a quitline. Ask your doctor about stop-smoking programs and medicines. ? Keep trying. When should you call for help? Watch closely for changes in your health, and be sure to contact your doctor if you have any problems. Where can you learn more? Go to http://brady-sharmin.info/. Enter L004 in the search box to learn more about \"Secondhand Smoke: Care Instructions. \"  Current as of: September 26, 2018  Content Version: 11.9  © 3198-4556 [employer]. Care instructions adapted under license by [employer] (which disclaims liability or warranty for this information).  If you have questions about a medical condition or this instruction, always ask your healthcare professional. [address] any warranty or liability for your use of this information. DISCHARGE SUMMARY from Nurse    PATIENT INSTRUCTIONS:    After general anesthesia or intravenous sedation, for 24 hours or while taking prescription Narcotics:  · Limit your activities  · Do not drive and operate hazardous machinery  · Do not make important personal or business decisions  · Do  not drink alcoholic beverages  · If you have not urinated within 8 hours after discharge, please contact your surgeon on call. Report the following to your surgeon:  · Excessive pain, swelling, redness or odor of or around the surgical area  · Temperature over 100.5  · Nausea and vomiting lasting longer than 4 hours or if unable to take medications  · Any signs of decreased circulation or nerve impairment to extremity: change in color, persistent  numbness, tingling, coldness or increase pain    What to do at Home:      If you experience any of the following symptoms chest pain, palpitations, shortness of breath, please follow up with cardiology. *  Please give a list of your current medications to your Primary Care Provider. *  Please update this list whenever your medications are discontinued, doses are      changed, or new medications (including over-the-counter products) are added. *  Please carry medication information at all times in case of emergency situations. These are general instructions for a healthy lifestyle:    No smoking/ No tobacco products/ Avoid exposure to second hand smoke  Surgeon General's Warning:  Quitting smoking now greatly reduces serious risk to your health.     Obesity, smoking, and sedentary lifestyle greatly increases your risk for illness    A healthy diet, regular physical exercise & weight monitoring are important for maintaining a healthy lifestyle    You may be retaining fluid if you have a history of heart failure or if you experience any of the following symptoms:  Weight gain of 3 pounds or more overnight or 5 pounds in a week, increased swelling in our hands or feet or shortness of breath while lying flat in bed. Please call your doctor as soon as you notice any of these symptoms; do not wait until your next office visit. Recognize signs and symptoms of STROKE:    F-face looks uneven    A-arms unable to move or move unevenly    S-speech slurred or non-existent    T-time-call 911 as soon as signs and symptoms begin-DO NOT go       Back to bed or wait to see if you get better-TIME IS BRAIN. Warning Signs of HEART ATTACK     Call 911 if you have these symptoms:   Chest discomfort. Most heart attacks involve discomfort in the center of the chest that lasts more than a few minutes, or that goes away and comes back. It can feel like uncomfortable pressure, squeezing, fullness, or pain.  Discomfort in other areas of the upper body. Symptoms can include pain or discomfort in one or both arms, the back, neck, jaw, or stomach.  Shortness of breath with or without chest discomfort.  Other signs may include breaking out in a cold sweat, nausea, or lightheadedness. Don't wait more than five minutes to call 911 - MINUTES MATTER! Fast action can save your life. Calling 911 is almost always the fastest way to get lifesaving treatment. Emergency Medical Services staff can begin treatment when they arrive -- up to an hour sooner than if someone gets to the hospital by car. The discharge information has been reviewed with the patient. The patient verbalized understanding. Discharge medications reviewed with the patient and appropriate educational materials and side effects teaching were provided.   ___________________________________________________________________________________________________________________________________

## 2019-04-27 NOTE — PROGRESS NOTES
Bedside shift report received from Ирина Mcdonald, [facility]. R groin site gauze with small amount of new sanguinous drainage. Dressing removed, site no longer bleeding. Gauze and tegaderm applied to site. Will continue to monitor. L groin site clean, dry and intact.